# Patient Record
Sex: FEMALE | Race: WHITE | NOT HISPANIC OR LATINO | URBAN - METROPOLITAN AREA
[De-identification: names, ages, dates, MRNs, and addresses within clinical notes are randomized per-mention and may not be internally consistent; named-entity substitution may affect disease eponyms.]

---

## 2018-03-01 ENCOUNTER — CONSULTATION (OUTPATIENT)
Dept: URBAN - METROPOLITAN AREA CLINIC 11 | Facility: CLINIC | Age: 73
End: 2018-03-01

## 2018-03-01 VITALS — HEIGHT: 55 IN | DIASTOLIC BLOOD PRESSURE: 75 MMHG | SYSTOLIC BLOOD PRESSURE: 120 MMHG

## 2018-03-01 ASSESSMENT — TONOMETRY
OD_IOP_MMHG: 15
OS_IOP_MMHG: 15

## 2018-03-01 ASSESSMENT — VISUAL ACUITY
OD_CC: 20/30
OS_CC: 20/30-2

## 2018-07-27 ENCOUNTER — IMPORTED ENCOUNTER (OUTPATIENT)
Dept: URBAN - METROPOLITAN AREA CLINIC 9 | Facility: CLINIC | Age: 73
End: 2018-07-27

## 2018-09-18 ENCOUNTER — IMPORTED ENCOUNTER (OUTPATIENT)
Dept: URBAN - METROPOLITAN AREA CLINIC 9 | Facility: CLINIC | Age: 73
End: 2018-09-18

## 2018-11-21 ENCOUNTER — IMPORTED ENCOUNTER (OUTPATIENT)
Dept: URBAN - METROPOLITAN AREA CLINIC 9 | Facility: CLINIC | Age: 73
End: 2018-11-21

## 2018-12-07 ENCOUNTER — IMPORTED ENCOUNTER (OUTPATIENT)
Dept: URBAN - METROPOLITAN AREA CLINIC 9 | Facility: CLINIC | Age: 73
End: 2018-12-07

## 2018-12-20 ENCOUNTER — IMPORTED ENCOUNTER (OUTPATIENT)
Dept: URBAN - METROPOLITAN AREA CLINIC 9 | Facility: CLINIC | Age: 73
End: 2018-12-20

## 2019-09-06 NOTE — PATIENT DISCUSSION
(H10.45) Other chronic allergic conjunctivitis - Assesment : Examination revealed Allergic Conjunctivitis. - Plan : Discontinue silver drops. Start Azasite QD OS only for ten days then stop. Monitor for changes. Advised patient to call our office with decreased vision or an increase in symptoms.     RTC 1 week /EXAM/OCT mac

## 2019-09-06 NOTE — PATIENT DISCUSSION
(H68.366) Keratoconjunct sicca, not specified as Sjogren's, bilateral - Assesment : Examination revealed Dry Eye Syndrome OU. - Plan : Recommend using lubricating drops 3-4 times daily especially before reading or using eyes extensively for comfort and visual stability. Monitor for changes. Advised patient to call our office with decreased vision or increased symptoms.

## 2019-09-12 NOTE — PATIENT DISCUSSION
(K80.608) Keratoconjunct sicca, not specified as Sjogren's, bilateral - Assesment : Examination revealed Dry Eye Syndrome OU which may be a contributing factor to vision not being clear. - Plan : Continue using lubricating drops 3-4 times daily especially before reading or using eyes extensively for comfort and visual stability. Monitor for changes. Advised patient to call our office with decreased vision or increased symptoms. Glasses RX updated and given to help with visual acuity and function.    RTC 1 year/EXAM

## 2019-09-12 NOTE — PATIENT DISCUSSION
(H10.45) Other chronic allergic conjunctivitis - Assesment : Examination revealed Allergic Conjunctivitis. - Plan : Discontinue Azasite QD OS only for ten days then stop. Recommend changing make up once a month. Monitor for changes. Advised patient to call our office with decreased vision or an increase in symptoms.

## 2019-10-11 ENCOUNTER — IMPORTED ENCOUNTER (OUTPATIENT)
Dept: URBAN - METROPOLITAN AREA CLINIC 9 | Facility: CLINIC | Age: 74
End: 2019-10-11

## 2019-12-17 ENCOUNTER — IMPORTED ENCOUNTER (OUTPATIENT)
Dept: URBAN - METROPOLITAN AREA CLINIC 9 | Facility: CLINIC | Age: 74
End: 2019-12-17

## 2020-02-28 ENCOUNTER — IMPORTED ENCOUNTER (OUTPATIENT)
Dept: URBAN - METROPOLITAN AREA CLINIC 9 | Facility: CLINIC | Age: 75
End: 2020-02-28

## 2020-09-24 ENCOUNTER — IMPORTED ENCOUNTER (OUTPATIENT)
Dept: URBAN - METROPOLITAN AREA CLINIC 9 | Facility: CLINIC | Age: 75
End: 2020-09-24

## 2020-10-15 ENCOUNTER — IMPORTED ENCOUNTER (OUTPATIENT)
Dept: URBAN - METROPOLITAN AREA CLINIC 9 | Facility: CLINIC | Age: 75
End: 2020-10-15

## 2020-12-02 NOTE — PATIENT DISCUSSION
Pt interested in lifting brows and remoiving lower adnexa fat. Pt given information for Dr Love Darnell.

## 2020-12-02 NOTE — PATIENT DISCUSSION
(H31.105) Keratoconjunct sicca, not specified as Sjogren's, bilateral - Assesment : Examination revealed Dry Eye Syndrome OU which may be a contributing factor to vision not being clear. - Plan : Continue using lubricating drops 3-4 times daily especially before reading or using eyes extensively for comfort and visual stability. Monitor for changes. Advised patient to call our office with decreased vision or increased symptoms. Glasses RX updated and given to help with visual acuity and function.    RTC 1 year/EXAM.

## 2021-01-28 NOTE — PATIENT DISCUSSION
Recommend * units of Botox. Patient elects Botox injection. *units discarded, * units used. (discussed risks and benefits of BOTOX. ..) rec 50 units.

## 2021-01-28 NOTE — PATIENT DISCUSSION
Recommend Mini lower lift, platysmaplasty post-tragel, SMAS to cheeks(discussed risks and benefits of sx. .. ).

## 2021-02-25 ENCOUNTER — IMPORTED ENCOUNTER (OUTPATIENT)
Dept: URBAN - METROPOLITAN AREA CLINIC 9 | Facility: CLINIC | Age: 76
End: 2021-02-25

## 2021-08-31 ENCOUNTER — IMPORTED ENCOUNTER (OUTPATIENT)
Dept: URBAN - METROPOLITAN AREA CLINIC 9 | Facility: CLINIC | Age: 76
End: 2021-08-31

## 2021-10-16 ASSESSMENT — VISUAL ACUITY
OD_SC: 20/25 SN

## 2022-03-31 ENCOUNTER — CLINIC PROCEDURE ONLY (OUTPATIENT)
Dept: URBAN - METROPOLITAN AREA CLINIC 15 | Facility: CLINIC | Age: 77
End: 2022-03-31

## 2022-03-31 DIAGNOSIS — L98.8: ICD-10-CM

## 2022-03-31 PROCEDURE — 64612C BOTOX/COSMETIC

## 2022-03-31 PROCEDURE — 11951 SUBQ NJX FIL MATRL 1.1-5.0CC: CPT

## 2022-03-31 ASSESSMENT — VISUAL ACUITY: OU_SC: 20/25

## 2022-06-29 RX ORDER — ESCITALOPRAM OXALATE 10 MG/1
TABLET ORAL
COMMUNITY

## 2022-06-29 RX ORDER — HYDROCHLOROTHIAZIDE 12.5 MG/1
TABLET ORAL
COMMUNITY
Start: 2022-01-10 | End: 2022-08-11

## 2022-06-29 RX ORDER — BIMATOPROST 0.01 %
DROPS OPHTHALMIC (EYE)
COMMUNITY

## 2022-06-29 RX ORDER — LOSARTAN POTASSIUM 100 MG/1
TABLET ORAL
COMMUNITY
Start: 2021-07-02

## 2022-06-29 RX ORDER — VALACYCLOVIR HYDROCHLORIDE 500 MG/1
TABLET, FILM COATED ORAL
COMMUNITY
Start: 2022-02-14 | End: 2022-08-01 | Stop reason: SDUPTHER

## 2022-06-29 RX ORDER — LEVOTHYROXINE SODIUM 0.07 MG/1
TABLET ORAL
COMMUNITY

## 2022-06-29 RX ORDER — BUPROPION HYDROCHLORIDE 150 MG/1
TABLET ORAL
COMMUNITY
End: 2022-09-07

## 2022-06-29 RX ORDER — METOPROLOL SUCCINATE 100 MG/1
TABLET, EXTENDED RELEASE ORAL
COMMUNITY

## 2022-06-29 RX ORDER — ASPIRIN 81 MG/1
TABLET ORAL
COMMUNITY
End: 2022-09-07

## 2022-06-29 RX ORDER — ROSUVASTATIN CALCIUM 20 MG/1
TABLET, COATED ORAL
COMMUNITY
End: 2022-10-18 | Stop reason: SDUPTHER

## 2022-06-29 RX ORDER — FENOFIBRATE 145 MG/1
TABLET, COATED ORAL
COMMUNITY
End: 2022-08-01 | Stop reason: SDUPTHER

## 2022-06-29 RX ORDER — ACETAMINOPHEN 325 MG/1
TABLET ORAL
COMMUNITY
End: 2022-08-11

## 2022-06-29 RX ORDER — DULAGLUTIDE 1.5 MG/.5ML
INJECTION, SOLUTION SUBCUTANEOUS
COMMUNITY
End: 2023-02-11

## 2022-08-11 PROBLEM — F41.9 ANXIETY: Status: ACTIVE | Noted: 2022-08-11

## 2022-08-11 PROBLEM — M54.40 LUMBAGO WITH SCIATICA: Status: ACTIVE | Noted: 2021-11-03

## 2022-08-11 PROBLEM — Z96.651 HISTORY OF ARTHROPLASTY OF RIGHT KNEE: Status: ACTIVE | Noted: 2022-08-11

## 2022-08-11 PROBLEM — M51.9 DISORDER OF INTERVERTEBRAL DISC OF LUMBAR SPINE: Status: ACTIVE | Noted: 2022-08-11

## 2022-08-11 PROBLEM — R20.0 NUMBNESS: Status: ACTIVE | Noted: 2022-08-11

## 2022-08-11 PROBLEM — F32.5 MAJOR DEPRESSION, SINGLE EPISODE, IN COMPLETE REMISSION (HCC): Status: ACTIVE | Noted: 2022-08-11

## 2022-08-11 PROBLEM — M67.912 TENDINOPATHY OF LEFT ROTATOR CUFF: Status: ACTIVE | Noted: 2022-08-11

## 2022-08-11 PROBLEM — M94.211 CHONDROMALACIA, RIGHT SHOULDER: Status: ACTIVE | Noted: 2022-08-11

## 2022-08-11 PROBLEM — M53.3 SACROCOCCYGEAL DISORDERS, NOT ELSEWHERE CLASSIFIED: Status: ACTIVE | Noted: 2022-08-11

## 2022-08-11 PROBLEM — F41.8 DEPRESSION WITH ANXIETY: Status: ACTIVE | Noted: 2022-08-11

## 2022-08-11 PROBLEM — M25.476 SWELLING OF FIRST METATARSOPHALANGEAL (MTP) JOINT: Status: ACTIVE | Noted: 2022-08-11

## 2022-08-11 PROBLEM — Z96.642 HISTORY OF LEFT HIP REPLACEMENT: Status: ACTIVE | Noted: 2022-08-11

## 2022-08-11 PROBLEM — G62.9 NEUROPATHY: Status: ACTIVE | Noted: 2022-08-11

## 2022-08-11 PROBLEM — M67.921 TENDINOPATHY OF RIGHT BICEPS TENDON: Status: ACTIVE | Noted: 2022-08-11

## 2022-08-11 PROBLEM — M67.952 TENDINOPATHY OF LEFT GLUTEAL REGION: Status: ACTIVE | Noted: 2022-08-11

## 2022-08-11 PROBLEM — S73.199A TEAR OF ACETABULAR LABRUM: Status: ACTIVE | Noted: 2022-08-11

## 2022-08-11 PROBLEM — M47.9 SPONDYLOSIS: Status: ACTIVE | Noted: 2022-08-11

## 2022-08-11 PROBLEM — E78.5 HYPERLIPIDEMIA: Status: ACTIVE | Noted: 2022-08-11

## 2022-08-11 PROBLEM — M75.111 INCOMPLETE TEAR OF RIGHT ROTATOR CUFF: Status: ACTIVE | Noted: 2022-08-11

## 2022-08-11 PROBLEM — M12.812 ROTATOR CUFF ARTHROPATHY OF LEFT SHOULDER: Status: ACTIVE | Noted: 2022-08-11

## 2022-08-11 PROBLEM — R92.8 ABNORMAL MAMMOGRAM: Status: ACTIVE | Noted: 2022-08-11

## 2022-08-11 PROBLEM — J01.90 SINUSITIS, ACUTE: Status: ACTIVE | Noted: 2019-12-09

## 2022-08-11 PROBLEM — M76.892 HIP FLEXOR TENDINITIS, LEFT: Status: ACTIVE | Noted: 2022-08-11

## 2022-08-11 PROBLEM — M16.11 PRIMARY OSTEOARTHRITIS OF RIGHT HIP: Status: ACTIVE | Noted: 2022-08-11

## 2022-08-11 PROBLEM — R20.9 SKIN SENSATION DISTURBANCE: Status: ACTIVE | Noted: 2022-08-11

## 2022-08-11 PROBLEM — M70.70 ILIOPSOAS BURSITIS: Status: ACTIVE | Noted: 2022-08-11

## 2022-08-11 PROBLEM — Z96.611 STATUS POST REVERSE TOTAL ARTHROPLASTY OF RIGHT SHOULDER: Status: ACTIVE | Noted: 2022-08-11

## 2022-08-11 PROBLEM — M23.8X2 DEFICIENCY OF ANTERIOR CRUCIATE LIGAMENT OF LEFT KNEE: Status: ACTIVE | Noted: 2022-08-11

## 2022-08-11 PROBLEM — H35.30 MACULAR DEGENERATION: Status: ACTIVE | Noted: 2022-08-11

## 2022-08-11 PROBLEM — M75.02 ADHESIVE CAPSULITIS OF LEFT SHOULDER: Status: ACTIVE | Noted: 2022-08-11

## 2022-08-11 PROBLEM — M54.50 LOW BACK PAIN: Status: ACTIVE | Noted: 2021-09-23

## 2022-08-11 PROBLEM — E11.9 DIABETES MELLITUS (HCC): Status: ACTIVE | Noted: 2022-08-11

## 2022-08-11 PROBLEM — E55.9 VITAMIN D DEFICIENCY: Status: ACTIVE | Noted: 2022-08-11

## 2022-08-11 PROBLEM — M17.12 PRIMARY OSTEOARTHRITIS OF LEFT KNEE: Status: ACTIVE | Noted: 2022-08-11

## 2022-08-11 PROBLEM — M48.02 CERVICAL SPINAL STENOSIS: Status: ACTIVE | Noted: 2022-08-11

## 2022-08-11 PROBLEM — M51.26 LUMBAR DISC HERNIATION: Status: ACTIVE | Noted: 2022-08-11

## 2022-08-11 PROBLEM — M47.812 CERVICAL SPONDYLOSIS: Status: ACTIVE | Noted: 2022-08-11

## 2022-08-11 PROBLEM — G47.00 INSOMNIA: Status: ACTIVE | Noted: 2022-08-11

## 2022-08-11 PROBLEM — N95.9 MENOPAUSAL AND POSTMENOPAUSAL DISORDER: Status: ACTIVE | Noted: 2022-08-11

## 2022-08-11 PROBLEM — E11.9 DIABETES MELLITUS, TYPE 2 (HCC): Status: ACTIVE | Noted: 2022-08-11

## 2022-08-11 PROBLEM — K21.9 GASTROESOPHAGEAL REFLUX DISEASE: Status: ACTIVE | Noted: 2022-08-11

## 2022-08-11 PROBLEM — E03.9 HYPOTHYROIDISM: Status: ACTIVE | Noted: 2022-08-11

## 2022-08-11 PROBLEM — S76.019A RUPTURE OF TENDON OF HIP: Status: ACTIVE | Noted: 2022-08-11

## 2022-08-11 PROBLEM — Z96.642 COMPLICATION OF INTERNAL LEFT HIP PROSTHESIS (HCC): Status: ACTIVE | Noted: 2022-08-11

## 2022-08-11 PROBLEM — M20.42 ACQUIRED HAMMER TOE OF LEFT FOOT: Status: ACTIVE | Noted: 2022-08-11

## 2022-08-11 PROBLEM — S42.252A CLOSED DISPLACED FRACTURE OF GREATER TUBEROSITY OF LEFT HUMERUS: Status: ACTIVE | Noted: 2022-08-11

## 2022-08-11 PROBLEM — M24.012 LOOSE BODY IN LEFT SHOULDER: Status: ACTIVE | Noted: 2022-08-11

## 2022-08-11 PROBLEM — M25.312 INSTABILITY OF LEFT SHOULDER JOINT: Status: ACTIVE | Noted: 2022-08-11

## 2022-08-11 PROBLEM — M53.3 PAIN OF RIGHT SACROILIAC JOINT: Status: ACTIVE | Noted: 2021-09-23

## 2022-08-11 PROBLEM — M75.120 COMPLETE TEAR OF ROTATOR CUFF: Status: ACTIVE | Noted: 2022-08-11

## 2022-08-11 PROBLEM — M51.36 DEGENERATION OF LUMBAR INTERVERTEBRAL DISC: Status: ACTIVE | Noted: 2022-08-11

## 2022-08-11 PROBLEM — I10 ESSENTIAL HYPERTENSION: Status: ACTIVE | Noted: 2021-09-27

## 2022-08-11 PROBLEM — M84.369A: Status: ACTIVE | Noted: 2022-08-11

## 2022-08-11 PROBLEM — E53.8 VITAMIN B12 DEFICIENCY: Status: ACTIVE | Noted: 2022-08-11

## 2022-08-11 PROBLEM — F32.9 MAJOR DEPRESSIVE DISORDER, SINGLE EPISODE, UNSPECIFIED: Status: ACTIVE | Noted: 2022-08-11

## 2022-08-11 PROBLEM — M15.9 GENERALIZED OSTEOARTHRITIS OF HAND: Status: ACTIVE | Noted: 2022-08-11

## 2022-08-11 PROBLEM — M70.61 TROCHANTERIC BURSITIS OF RIGHT HIP: Status: ACTIVE | Noted: 2022-08-11

## 2022-08-11 PROBLEM — M19.90 OSTEOARTHROSIS: Status: ACTIVE | Noted: 2022-08-11

## 2022-08-11 PROBLEM — S42.252D: Status: ACTIVE | Noted: 2022-08-11

## 2022-08-11 PROBLEM — M75.82 TENDONITIS OF LEFT ROTATOR CUFF: Status: ACTIVE | Noted: 2022-08-11

## 2022-08-11 PROBLEM — E78.1 HYPERTRIGLYCERIDEMIA: Status: ACTIVE | Noted: 2022-08-11

## 2022-08-11 PROBLEM — M79.10 MYALGIA, UNSPECIFIED SITE: Status: ACTIVE | Noted: 2019-12-11

## 2022-08-11 PROBLEM — M21.951 UNSPECIFIED ACQUIRED DEFORMITY OF RIGHT THIGH: Status: ACTIVE | Noted: 2022-08-11

## 2022-08-11 PROBLEM — I25.10 ATHEROSCLEROTIC HEART DISEASE OF NATIVE CORONARY ARTERY WITHOUT ANGINA PECTORIS: Status: ACTIVE | Noted: 2022-08-11

## 2022-08-11 PROBLEM — J34.89 SINUS DRAINAGE: Status: ACTIVE | Noted: 2022-08-11

## 2022-08-11 PROBLEM — T84.9XXA COMPLICATION OF INTERNAL LEFT HIP PROSTHESIS (HCC): Status: ACTIVE | Noted: 2022-08-11

## 2022-08-30 PROBLEM — R91.8 MASS OF UPPER LOBE OF LEFT LUNG: Status: ACTIVE | Noted: 2022-08-30

## 2022-09-06 PROBLEM — N18.30 CHRONIC RENAL DISEASE, STAGE III (HCC): Status: ACTIVE | Noted: 2022-09-06
